# Patient Record
Sex: MALE | Race: AMERICAN INDIAN OR ALASKA NATIVE
[De-identification: names, ages, dates, MRNs, and addresses within clinical notes are randomized per-mention and may not be internally consistent; named-entity substitution may affect disease eponyms.]

---

## 2017-10-31 ENCOUNTER — HOSPITAL ENCOUNTER (OUTPATIENT)
Dept: HOSPITAL 5 - FLUORO | Age: 67
Discharge: HOME | End: 2017-10-31
Attending: UROLOGY
Payer: MEDICARE

## 2017-10-31 DIAGNOSIS — N35.9: Primary | ICD-10-CM

## 2017-10-31 PROCEDURE — 74420 UROGRAPHY RTRGR +-KUB: CPT

## 2017-10-31 PROCEDURE — 51610 INJECTION FOR BLADDER X-RAY: CPT

## 2017-10-31 PROCEDURE — 74450 X-RAY URETHRA/BLADDER: CPT

## 2017-10-31 NOTE — FLUOROSCOPY REPORT
FLUOROSCOPY RETROGRADE UROGRAPHY



History: Urethral stricture



Findings: Informed consent was obtained. 44 fluoroscopic images were 

captured.



Using sterile technique, the meatus was cannulated with an 8 [employer] 

Collins catheter and secured by balloon inflation. A small amount of 

contrast agent was injected into the urethra. The images demonstrate a 

high grade stricture in the mid anterior urethra measuring 

approximately 1 cm in length. Stenosis is estimated at 80-90%. The 

remainder of the penile urethra is within normal limits. Reflux into 

the bladder could not be demonstrated on this exam.



Impression:

Urethral stricture as outlined above.

## 2018-01-23 ENCOUNTER — HOSPITAL ENCOUNTER (OUTPATIENT)
Dept: HOSPITAL 5 - OR | Age: 68
Discharge: HOME | End: 2018-01-23
Attending: UROLOGY
Payer: MEDICARE

## 2018-01-23 VITALS — SYSTOLIC BLOOD PRESSURE: 148 MMHG | DIASTOLIC BLOOD PRESSURE: 92 MMHG

## 2018-01-23 DIAGNOSIS — N35.012: Primary | ICD-10-CM

## 2018-01-23 DIAGNOSIS — Z87.891: ICD-10-CM

## 2018-01-23 PROCEDURE — 74450 X-RAY URETHRA/BLADDER: CPT

## 2018-01-23 PROCEDURE — 52000 CYSTOURETHROSCOPY: CPT

## 2018-01-23 PROCEDURE — 0TJB8ZZ INSPECTION OF BLADDER, VIA NATURAL OR ARTIFICIAL OPENING ENDOSCOPIC: ICD-10-PCS | Performed by: UROLOGY

## 2018-01-23 PROCEDURE — C1769 GUIDE WIRE: HCPCS

## 2018-01-23 PROCEDURE — A4217 STERILE WATER/SALINE, 500 ML: HCPCS

## 2018-01-23 NOTE — DISCHARGE SUMMARY
Short Stay Discharge Plan


Activity: other


Weight Bearing Status: Full Weight Bearing


Diet: low fat, low cholesterol, low salt


Special Instructions: other (inc fluids )


Durable Medical Equipment Needed Upon Discharge: other


Follow up with: 


CYNDIE WARE MD [Primary Care Provider] - 7 Days


EDMOND HIGH MD [Staff Physician] - 7 Days

## 2018-01-23 NOTE — POST ANESTHESIA EVALUATION
- Post Anesthesia Evaluation


Patient Participated: Yes


Airway Patent: Yes


Stable Respiratory Function: Yes


Nausea/Vomiting: No


Temp > 96.8F: Yes


Pain Manageable: Yes


Anesthesia Complications: No


Block Receding Appropriately: Not Applicable

## 2018-01-23 NOTE — ANESTHESIA CONSULTATION
Anesthesia Consult and Med Hx


Date of service: 01/23/18





- Airway


Anesthetic Teeth Evaluation: Partials


ROM Head & Neck: Adequate


Mental/Hyoid Distance: Adequate


Mallampati Class: Class I


Intubation Access Assessment: Good





- Pulmonary Exam


CTA: Yes





- Cardiac Exam


Cardiac Exam: RRR





- Pre-Operative Health Status


ASA Pre-Surgery Classification: ASA2


Proposed Anesthetic Plan: General





- Pulmonary


Hx Smoking: Yes (EX-SMOKER - stopped 40+ years ago)


Hx Sleep Apnea: No (denies snoring)





- Cardiovascular System


Hx Hypertension: No


Hx Internal Defibrillator: No





- Central Nervous System


Hx Neuromuscular Disorder: No





- Gastrointestinal


Hx Gastroesophageal Reflux Disease: No





- Endocrine


Hx Renal Disease: No





- Other Systems


Hx Alcohol Use: No


Hx Substance Use: No


Hx Cancer: No

## 2018-01-23 NOTE — ANESTHESIA DAY OF SURGERY
Anesthesia Day of Surgery





- Day of Surgery


Patient Examined: Yes


Patient H&P Reviewed: Yes


Patient is NPO: Yes


Beta Blockers: Yes


Cardiac Clearance: Yes


Pulmonary Clearance: Yes

## 2018-01-23 NOTE — POST OPERATIVE NOTE
Date of procedure: 01/23/18


Pre-op diagnosis: urethral stricture


Post-op diagnosis: same


Findings: 





same 2.5 cm stricture 


Procedure: 





cysto dviu 


Anesthesia: GETA


Surgeon: EDMOND HIGH


Estimated blood loss: minimal


Pathology: none


Specimen disposition: to lab


Condition: stable


Disposition: PACU

## 2018-01-24 NOTE — OPERATIVE REPORT
PREOPERATIVE DIAGNOSES:  Urinary retention, severe urethral stricture.



POSTOPERATIVE DIAGNOSIS:  A 2.5 to 3 cm urethral stricture with severe scarring

at the bulbomembranous junction.



PROCEDURES:  Retrograde urethrogram, cystoscopy, insertion of a wire, direct

vision internal urethrotomy, and insertion of [employer] catheter.



SURGEON:  Antwon Villa MD



ANESTHESIA:  General.



FINDINGS:  This is a gentleman with difficulty voiding.  He has had dilatations

for 10 years at different urologists.  We offered him an open repair.  He wanted

to try dilation first.  He now presents for cystoscopy.



DESCRIPTION OF PROCEDURE:  The patient brought to the operating room and placed

on the operating table.  Following induction of anesthesia, placed in lithotomy

position, prepped and draped in the usual sterile fashion.  Cystourethroscopy

showed a very small urethral opening.  Urethrogram showed the stricture to be

longer than it previously looked on previous urethrogram.  Wire coiled in the

bladder under fluoroscopic guidance and stricture was very dense and very thick.

 There was no bleeding.  The tissue was totally avascular, very white and

blanched.  We were able to get into the bladder.  There were no bladder lesions.

 A 22-[employer] was placed.  The patient tolerated the procedure well.  Bladder

was moderately trabeculated, stricture measured 2.5 to 3 cm, brought to recovery

room, no significant bleeding, in stable condition.





DD: 01/24/2018 08:52

DT: 01/24/2018 09:38

JOB# [id number]  [id number]

RUDY/LIN

## 2018-01-24 NOTE — FLUOROSCOPY REPORT
FLUOROSCOPY RETROGRADE URETHROGRAM



History: Urethral stricture.



Findings: Correlation is made with the exam dated 10/31/17. Fluoroscopy 

was provided by radiology during today's exam. 9 fluoroscopic images 

were captured. The images demonstrate a persistent moderate to 

high-grade stricture in the anterior urethra. The last image 

demonstrates placement of a urinary catheter transversing the 

stricture. Please correlate with the procedural report by Dr. Villa 

if needed.



Impression: Urethral stricture. Catheter placement.